# Patient Record
Sex: FEMALE | Race: WHITE | NOT HISPANIC OR LATINO | Employment: FULL TIME
[De-identification: names, ages, dates, MRNs, and addresses within clinical notes are randomized per-mention and may not be internally consistent; named-entity substitution may affect disease eponyms.]

---

## 2017-10-01 ENCOUNTER — HEALTH MAINTENANCE LETTER (OUTPATIENT)
Age: 42
End: 2017-10-01

## 2022-03-31 ENCOUNTER — THERAPY VISIT (OUTPATIENT)
Dept: PHYSICAL THERAPY | Facility: CLINIC | Age: 47
End: 2022-03-31
Payer: COMMERCIAL

## 2022-03-31 DIAGNOSIS — M79.671 RIGHT FOOT PAIN: ICD-10-CM

## 2022-03-31 PROCEDURE — 97110 THERAPEUTIC EXERCISES: CPT | Mod: GP | Performed by: PHYSICAL THERAPIST

## 2022-03-31 PROCEDURE — 97161 PT EVAL LOW COMPLEX 20 MIN: CPT | Mod: GP | Performed by: PHYSICAL THERAPIST

## 2022-03-31 NOTE — LETTER
[facility]  [address]  [place] [address]  [phone]    2022    Re: Alise Rouse   :   1975  MRN:  [id number]   REFERRING PHYSICIAN:   Carlos ABEL [facility]    Date of Initial Evaluation:  3/31/22  Visits:  Rxs Used: 1  Reason for Referral:  Right foot pain    Physical Therapy Initial Evaluation    Subjective:    Patient Health History  Alise Rouse being seen for Tenex treatment on heal for planter fascitis.   Problem began: 3/24/2022.   Problem occurred: plantar fasciitis from running   Pain is reported as 0/10 on pain scale.  General health as reported by patient is excellent.  Pertinent medical history includes: none.   Red flags:  None as reported by patient.  Medical allergies: none.   Surgeries include:  Orthopedic surgery.    Current medications:  None.    Current occupation is Optometrist.     Pt has a few years of running history completing up to 10mile races, started having plantar fasciitis pain without resolution and is now s/p R tenex to plantar fascia on 3/24/22. She is TTWB with rollabout and CAM walking boot x2weeks (22) and will have a followup with MD at that time for further restrictions/limitations.                  Objective:    ANKLE:     PROM L PROM R AROM L AROM R MMT L MMT R   Dorsiflexion   15 12 5/5 5/5   Plantarflexion   68 66 5/5 5/5   Inversion   40 35 5/5 4/5   Eversion   28 25 5/5 4/5   G Toe Ext         G Toe Flex           Edema:  No gross edema    Palpation: mild TTP R medial heel incision    Gait: not tested, she is TTWB with CAM boot using rollabout in clinic    Special Tests: deferred due to known procedure    Assessment/Plan:    Patient is a 46 year old female with R foot complaints.    Patient has the following significant findings with corresponding treatment plan.                Diagnosis 1:  S/p R plantar fascia Tenex  Pain -  hot/cold therapy,  manual therapy, education and home program  Decreased ROM/flexibility - manual therapy, therapeutic exercise, therapeutic activity and home program  Decreased strength - therapeutic exercise, therapeutic activities and home program  Impaired gait - gait training, assistive devices and home program    Cumulative Therapy Evaluation is: Low complexity.  Previous and current functional limitations:  (See Goal Flow Sheet for this information)    Short term and Long term goals: (See Goal Flow Sheet for this information)   Communication ability:  Patient appears to be able to clearly communicate and understand verbal and written communication and follow directions correctly.  Treatment Explanation - The following has been discussed with the patient:   RX ordered/plan of care  Anticipated outcomes  Possible risks and side effects  This patient would benefit from PT intervention to resume normal activities.   Rehab potential is good.    Frequency:  1 X week, once daily  Duration:  for 12 weeks  Discharge Plan:  Achieve all LTG.  Independent in home treatment program.  Reach maximal therapeutic benefit.    Thank you for your referral.    INQUIRIES  Therapist: Giorgi Leblanc DPT  [facility] [place] [facility]  [address]  [place] [address]  Phone: [phone]  Fax: [phone]

## 2022-03-31 NOTE — PROGRESS NOTES
Physical Therapy Initial Evaluation  Subjective:    Patient Health History  Alise Rouse being seen for Tenex treatment on heal for planter fascitis.     Problem began: 3/24/2022.   Problem occurred: plantar fasciitis from running   Pain is reported as 0/10 on pain scale.  General health as reported by patient is excellent.  Pertinent medical history includes: none.   Red flags:  None as reported by patient.  Medical allergies: none.   Surgeries include:  Orthopedic surgery.    Current medications:  None.    Current occupation is Optometrist.                     Pt has a few years of running history completing up to 10mile races, started having plantar fasciitis pain without resolution and is now s/p R tenex to plantar fascia on 3/24/22. She is TTWB with rollabout and CAM walking boot x2weeks (4/7/22) and will have a followup with MD at that time for further restrictions/limitations.                    Objective:      ANKLE:     PROM L PROM R AROM L AROM R MMT L MMT R   Dorsiflexion   15 12 5/5 5/5   Plantarflexion   68 66 5/5 5/5   Inversion   40 35 5/5 4/5   Eversion   28 25 5/5 4/5   G Toe Ext         G Toe Flex           Edema:  No gross edema    Palpation: mild TTP R medial heel incision    Gait: not tested, she is TTWB with CAM boot using rollabout in clinic    Special Tests: deferred due to known procedure              System    Physical Exam    General     ROS    Assessment/Plan:    Patient is a 46 year old female with R foot complaints.    Patient has the following significant findings with corresponding treatment plan.                Diagnosis 1:  S/p R plantar fascia Tenex  Pain -  hot/cold therapy, manual therapy, education and home program  Decreased ROM/flexibility - manual therapy, therapeutic exercise, therapeutic activity and home program  Decreased strength - therapeutic exercise, therapeutic activities and home program  Impaired gait - gait training, assistive devices and home  program    Cumulative Therapy Evaluation is: Low complexity.    Previous and current functional limitations:  (See Goal Flow Sheet for this information)    Short term and Long term goals: (See Goal Flow Sheet for this information)     Communication ability:  Patient appears to be able to clearly communicate and understand verbal and written communication and follow directions correctly.  Treatment Explanation - The following has been discussed with the patient:   RX ordered/plan of care  Anticipated outcomes  Possible risks and side effects  This patient would benefit from PT intervention to resume normal activities.   Rehab potential is good.    Frequency:  1 X week, once daily  Duration:  for 12 weeks  Discharge Plan:  Achieve all LTG.  Independent in home treatment program.  Reach maximal therapeutic benefit.    Please refer to the daily flowsheet for treatment today, total treatment time and time spent performing 1:1 timed codes.

## 2022-04-13 ENCOUNTER — THERAPY VISIT (OUTPATIENT)
Dept: PHYSICAL THERAPY | Facility: CLINIC | Age: 47
End: 2022-04-13
Payer: COMMERCIAL

## 2022-04-13 DIAGNOSIS — M79.671 RIGHT FOOT PAIN: Primary | ICD-10-CM

## 2022-04-13 PROCEDURE — 97110 THERAPEUTIC EXERCISES: CPT | Mod: GP | Performed by: PHYSICAL THERAPIST

## 2022-04-21 ENCOUNTER — THERAPY VISIT (OUTPATIENT)
Dept: PHYSICAL THERAPY | Facility: CLINIC | Age: 47
End: 2022-04-21
Payer: COMMERCIAL

## 2022-04-21 DIAGNOSIS — M79.671 RIGHT FOOT PAIN: Primary | ICD-10-CM

## 2022-04-21 PROCEDURE — 97110 THERAPEUTIC EXERCISES: CPT | Mod: GP | Performed by: PHYSICAL THERAPIST

## 2022-04-28 ENCOUNTER — THERAPY VISIT (OUTPATIENT)
Dept: PHYSICAL THERAPY | Facility: CLINIC | Age: 47
End: 2022-04-28
Payer: COMMERCIAL

## 2022-04-28 DIAGNOSIS — M79.671 RIGHT FOOT PAIN: Primary | ICD-10-CM

## 2022-04-28 PROCEDURE — 97112 NEUROMUSCULAR REEDUCATION: CPT | Mod: GP | Performed by: PHYSICAL THERAPIST

## 2022-04-28 PROCEDURE — 97110 THERAPEUTIC EXERCISES: CPT | Mod: GP | Performed by: PHYSICAL THERAPIST

## 2022-05-10 ENCOUNTER — THERAPY VISIT (OUTPATIENT)
Dept: PHYSICAL THERAPY | Facility: CLINIC | Age: 47
End: 2022-05-10
Payer: COMMERCIAL

## 2022-05-10 DIAGNOSIS — M79.671 RIGHT FOOT PAIN: Primary | ICD-10-CM

## 2022-05-10 PROCEDURE — 97110 THERAPEUTIC EXERCISES: CPT | Mod: GP | Performed by: PHYSICAL THERAPIST

## 2022-05-10 PROCEDURE — 97530 THERAPEUTIC ACTIVITIES: CPT | Mod: GP | Performed by: PHYSICAL THERAPIST

## 2022-05-31 ENCOUNTER — THERAPY VISIT (OUTPATIENT)
Dept: PHYSICAL THERAPY | Facility: CLINIC | Age: 47
End: 2022-05-31
Payer: COMMERCIAL

## 2022-05-31 DIAGNOSIS — M79.671 RIGHT FOOT PAIN: Primary | ICD-10-CM

## 2022-05-31 PROCEDURE — 97110 THERAPEUTIC EXERCISES: CPT | Mod: GP | Performed by: PHYSICAL THERAPIST

## 2022-05-31 NOTE — LETTER
[facility]  [address] [facility] [id number] [phone]    May 31, 2022    Re: Alise Rouse   :   1975  MRN:  [id number]   REFERRING PHYSICIAN:   Carlos Pendleton    [facility]    Date of Initial Evaluation:  3/31/22  Visits:  Rxs Used: 6  Reason for Referral:  Right foot pain    DISCHARGE REPORT  Progress reporting period is from 3/31/22 to 22.     SUBJECTIVE  Subjective: pt reports she has started her golf league and feels the pain in both feet from walking 9holes, she just got new shoes that have more cushion and golfed once which was slightly better. has been running/walking 3/2 regularly.   Current Pain level: 1/10   Initial Pain level: 0/10   The objective findings are from DOS 22.    OBJECTIVE  Objective: able to run/walk TM 4/1mins respectively      ASSESSMENT/PLAN  Updated problem list and treatment plan: Diagnosis 1:  S/p R Tenex to plantar flascia  STG/LTGs have been met or progress has been made towards goals:  Yes, progressing running/walking  Assessment of Progress: The patient's condition is improving.  Self Management Plans:  Patient has been instructed in a home treatment program.  Patient is independent in a home treatment program.  Alise continues to require the following intervention to meet STG and LTG's: PT intervention is no longer required to meet STG/LTG.  We will discharge this patient from PT.    Recommendations:  This patient is ready to be discharged from therapy and continue their home treatment program.    Thank you for your referral.    INQUIRIES  Therapist: Giorgi Leblanc DPT  [facility]  [address] [facility] [address]  Phone: [phone]  Fax: [phone]

## 2022-05-31 NOTE — PROGRESS NOTES
Subjective:  HPI  Physical Exam                    Objective:  System    Physical Exam    General     ROS    Assessment/Plan:    DISCHARGE REPORT    Progress reporting period is from 3/31/22 to 5/31/22.     SUBJECTIVE  Subjective: pt reports she has started her golf league and feels the pain in both feet from walking 9holes, she just got new shoes that have more cushion and golfed once which was slightly better. has been running/walking 3/2 regularly.   Current Pain level: 1/10   Initial Pain level: 0/10        ;   ,     The objective findings are from DOS 5/31/22.    OBJECTIVE  Objective: able to run/walk TM 4/1mins respectively      ASSESSMENT/PLAN  Updated problem list and treatment plan: Diagnosis 1:  S/p R Tenex to plantar flascia  STG/LTGs have been met or progress has been made towards goals:  Yes, progressing running/walking  Assessment of Progress: The patient's condition is improving.  Self Management Plans:  Patient has been instructed in a home treatment program.  Patient is independent in a home treatment program.  Alise continues to require the following intervention to meet STG and LTG's: PT intervention is no longer required to meet STG/LTG.  We will discharge this patient from PT.    Recommendations:  This patient is ready to be discharged from therapy and continue their home treatment program.    Please refer to the daily flowsheet for treatment today, total treatment time and time spent performing 1:1 timed codes.

## 2022-09-18 ENCOUNTER — OFFICE VISIT (OUTPATIENT)
Dept: URGENT CARE | Facility: URGENT CARE | Age: 47
End: 2022-09-18
Payer: COMMERCIAL

## 2022-09-18 VITALS
BODY MASS INDEX: 27.47 KG/M2 | TEMPERATURE: 97.4 F | DIASTOLIC BLOOD PRESSURE: 88 MMHG | HEIGHT: 67 IN | SYSTOLIC BLOOD PRESSURE: 143 MMHG | RESPIRATION RATE: 16 BRPM | WEIGHT: 175 LBS | HEART RATE: 68 BPM | OXYGEN SATURATION: 98 %

## 2022-09-18 DIAGNOSIS — R30.0 DYSURIA: ICD-10-CM

## 2022-09-18 DIAGNOSIS — R82.90 ABNORMAL URINE FINDING: ICD-10-CM

## 2022-09-18 DIAGNOSIS — N30.01 ACUTE CYSTITIS WITH HEMATURIA: Primary | ICD-10-CM

## 2022-09-18 LAB
ALBUMIN UR-MCNC: 100 MG/DL
APPEARANCE UR: ABNORMAL
BACTERIA #/AREA URNS HPF: ABNORMAL /HPF
BILIRUB UR QL STRIP: ABNORMAL
COLOR UR AUTO: ABNORMAL
GLUCOSE UR STRIP-MCNC: NEGATIVE MG/DL
HGB UR QL STRIP: ABNORMAL
KETONES UR STRIP-MCNC: NEGATIVE MG/DL
LEUKOCYTE ESTERASE UR QL STRIP: ABNORMAL
NITRATE UR QL: POSITIVE
OVAL FAT BODIES #/AREA URNS HPF: ABNORMAL /HPF
PH UR STRIP: 6 [PH] (ref 5–7)
RBC #/AREA URNS AUTO: >100 /HPF
SP GR UR STRIP: 1.02 (ref 1–1.03)
SQUAMOUS #/AREA URNS AUTO: ABNORMAL /LPF
UROBILINOGEN UR STRIP-ACNC: 0.2 E.U./DL
WBC #/AREA URNS AUTO: ABNORMAL /HPF
WBC CLUMPS #/AREA URNS HPF: PRESENT /HPF

## 2022-09-18 PROCEDURE — 87186 SC STD MICRODIL/AGAR DIL: CPT | Performed by: PHYSICIAN ASSISTANT

## 2022-09-18 PROCEDURE — 87086 URINE CULTURE/COLONY COUNT: CPT | Performed by: PHYSICIAN ASSISTANT

## 2022-09-18 PROCEDURE — 99203 OFFICE O/P NEW LOW 30 MIN: CPT | Performed by: PHYSICIAN ASSISTANT

## 2022-09-18 PROCEDURE — 81001 URINALYSIS AUTO W/SCOPE: CPT | Performed by: PHYSICIAN ASSISTANT

## 2022-09-18 RX ORDER — SULFAMETHOXAZOLE/TRIMETHOPRIM 800-160 MG
1 TABLET ORAL 2 TIMES DAILY
Qty: 14 TABLET | Refills: 0 | Status: SHIPPED | OUTPATIENT
Start: 2022-09-18 | End: 2022-09-25

## 2022-09-18 NOTE — PATIENT INSTRUCTIONS
Take full course of antibiotics- Bactrim twice daily for 7 days  Urine culture pending, we will call you only if culture shows resistance and change of antibiotic is required or if no growth to stop antibiotics and to follow-up with your primary care provider.  May use over the counter AZO pain relief or Uristat (phenazopyridine) for urinary burning but do not use for 24 hours before future urine tests.  Drink plenty of fluids. Limit caffeine and alcohol as these are bladder irritants.  May take tylenol or ibuprofen as needed for discomfort.   If you develop any vomiting, high fevers or lower back pain, these can be signs of a kidney infection and you should be seen in urgent care or in the ER.  Prevention of future infections by drinking cranberry juice, urination after intercourse and wiping from front to back after using the toilet.  Please follow up with primary care provider if symptoms return, if you're not improving, worsening or new symptoms or for any adverse reactions to medications.

## 2022-09-18 NOTE — PROGRESS NOTES
"Assessment & Plan     Dysuria  - UA Macro with Reflex to Micro and Culture - lab collect; Future  - UA Macro with Reflex to Micro and Culture - lab collect  - Urine Microscopic Exam  - Urine Culture    Acute cystitis with hematuria  - sulfamethoxazole-trimethoprim (BACTRIM DS) 800-160 MG tablet; Take 1 tablet by mouth 2 times daily for 7 days    Abnormal urine finding  Few oval fat bodies seen on microscopy. Follow up with PCP in 1-2 weeks for repeat urine testing.    Start Bactrim twice daily for 7 days today.  Adjust plan as needed based on urine culture result.    15 minutes spent on the date of the encounter doing chart review, patient visit, and documentation     Return in about 3 days (around 9/21/2022) for visit with primary care provider if not improving.     Noemi Garcia PA-C  [facility]    Subjective   Alise Rouse is a 47 year old who presents for the following health issues     Patient presents with:  Urinary Problem  Urgent Care      HPI    Alise presents to clinic today for evaluation of a presumed urinary tract infection.  Symptoms first began on Labor Day, 13 days ago.  She had a virtual visit and was started on Macrobid.  She finished this 1 week ago.  She notes that she had very minimal benefit when she took it and now symptoms feel significantly worse.  She currently has dysuria, urinary urgency and urinary frequency.  No fever, nausea, vomiting, body aches, flank pain.  She notes that her urine is very dark in appearance.    Review of Systems   ROS negative except as stated above.      Objective    BP (!) 143/88   Pulse 68   Temp 97.4  F (36.3  C) (Tympanic)   Resp 16   Ht 1.689 m (5' 6.5\")   Wt 79.4 kg (175 lb)   SpO2 98%   BMI 27.82 kg/m    Physical Exam   GENERAL: healthy, alert and no distress  RESP: lungs clear to auscultation - no rales, rhonchi or wheezes  CV: regular rate and rhythm, normal S1 S2, no S3 or S4, no murmur, click or rub  ABDOMEN: soft, " nontender  MS: no gross musculoskeletal defects noted, no edema  BACK: no CVA tenderness, no paralumbar tenderness    Results for orders placed or performed in visit on 09/18/22   UA Macro with Reflex to Micro and Culture - lab collect     Status: Abnormal    Specimen: Urine, Clean Catch   Result Value Ref Range    Color Urine Dark Yellow (A) Colorless, Straw, Light Yellow, Yellow    Appearance Urine Cloudy (A) Clear    Glucose Urine Negative Negative mg/dL    Bilirubin Urine Small (A) Negative    Ketones Urine Negative Negative mg/dL    Specific Gravity Urine 1.020 1.003 - 1.035    Blood Urine Large (A) Negative    pH Urine 6.0 5.0 - 7.0    Protein Albumin Urine 100  (A) Negative mg/dL    Urobilinogen Urine 0.2 0.2, 1.0 E.U./dL    Nitrite Urine Positive (A) Negative    Leukocyte Esterase Urine Moderate (A) Negative   Urine Microscopic Exam     Status: Abnormal   Result Value Ref Range    Bacteria Urine Moderate (A) None Seen /HPF    RBC Urine >100 (A) 0-2 /HPF /HPF    WBC Urine  (A) 0-5 /HPF /HPF    Squamous Epithelials Urine Few (A) None Seen /LPF    WBC Clumps Urine Present (A) None Seen /HPF    Oval Fat Bodies Urine Few (A) None Seen /HPF

## 2022-09-19 LAB — BACTERIA UR CULT: ABNORMAL

## 2023-09-21 ENCOUNTER — OFFICE VISIT (OUTPATIENT)
Dept: URGENT CARE | Facility: URGENT CARE | Age: 48
End: 2023-09-21
Payer: COMMERCIAL

## 2023-09-21 VITALS
OXYGEN SATURATION: 100 % | DIASTOLIC BLOOD PRESSURE: 88 MMHG | HEART RATE: 91 BPM | TEMPERATURE: 98.3 F | WEIGHT: 173.6 LBS | BODY MASS INDEX: 27.6 KG/M2 | SYSTOLIC BLOOD PRESSURE: 149 MMHG

## 2023-09-21 DIAGNOSIS — R30.0 DYSURIA: Primary | ICD-10-CM

## 2023-09-21 LAB
ALBUMIN UR-MCNC: NEGATIVE MG/DL
APPEARANCE UR: CLEAR
BACTERIA #/AREA URNS HPF: ABNORMAL /HPF
BILIRUB UR QL STRIP: NEGATIVE
CLUE CELLS: ABNORMAL
COLOR UR AUTO: YELLOW
GLUCOSE UR STRIP-MCNC: NEGATIVE MG/DL
HGB UR QL STRIP: ABNORMAL
KETONES UR STRIP-MCNC: NEGATIVE MG/DL
LEUKOCYTE ESTERASE UR QL STRIP: ABNORMAL
NITRATE UR QL: NEGATIVE
PH UR STRIP: 6 [PH] (ref 5–7)
RBC #/AREA URNS AUTO: ABNORMAL /HPF
SP GR UR STRIP: <=1.005 (ref 1–1.03)
SQUAMOUS #/AREA URNS AUTO: ABNORMAL /LPF
TRICHOMONAS, WET PREP: ABNORMAL
UROBILINOGEN UR STRIP-ACNC: 0.2 E.U./DL
WBC #/AREA URNS AUTO: ABNORMAL /HPF
WBC'S/HIGH POWER FIELD, WET PREP: ABNORMAL
YEAST, WET PREP: ABNORMAL

## 2023-09-21 PROCEDURE — 81001 URINALYSIS AUTO W/SCOPE: CPT | Performed by: NURSE PRACTITIONER

## 2023-09-21 PROCEDURE — 99213 OFFICE O/P EST LOW 20 MIN: CPT | Performed by: NURSE PRACTITIONER

## 2023-09-21 PROCEDURE — 87210 SMEAR WET MOUNT SALINE/INK: CPT | Performed by: NURSE PRACTITIONER

## 2023-09-21 RX ORDER — SULFAMETHOXAZOLE/TRIMETHOPRIM 800-160 MG
1 TABLET ORAL 2 TIMES DAILY
COMMUNITY
Start: 2023-09-15

## 2023-09-21 NOTE — PATIENT INSTRUCTIONS
May try using Pyridium/Azo 2-3 days for pain with urination.     Continue to push fluid   Complete oral antibiotic

## 2023-09-21 NOTE — PROGRESS NOTES
Assessment & Plan     1. Dysuria  No indication for infection at this time through urine or wet prep.  Recommend patient continue course of Bactrim.  May try using Pyridium for dysuria.  Recommend follow-up as she has planned with urology and her gynecologist.  We discussed red flag symptoms that would warrant emergent or urgent evaluation.  Patient verbalized understanding was agreement with this plan.  - UA Macroscopic with reflex to Microscopic and Culture; Future  - Wet preparation; Future  - UA Macroscopic with reflex to Microscopic and Culture  - Wet preparation  - UA Microscopic with Reflex to Culture    NERIS [facility]    Harvey Carrero is a 48 year old female who presents to clinic today for the following health issues:  Chief Complaint   Patient presents with    UTI     Urgency, burning and cramping. Sx 1 week. Pt seen at [facility] clinic on 15th, prescribed bactrim - helped with some sx but others still persisting. \  Pt has appt with urology but 6 weeks out.      HPI    Presents to clinic states that she was seen through [facility] clinic and she is being treated currently with Bactrim she has 3 days left of this antibiotic.  She states she is still having some dysuria however other symptoms have cleared such as blood in her urine.  She denies fever or flank pain.  She states she has a history of UTIs.  She has a follow-up appointment scheduled with a urologist.  She also has an appointment with her gynecologist.    Review of Systems  Constitutional, HEENT, cardiovascular, pulmonary, gi and gu systems are negative, except as otherwise noted.      Objective    BP (!) 149/88 (BP Location: Right arm, Cuff Size: Adult Regular)   Pulse 91   Temp 98.3  F (36.8  C) (Tympanic)   Wt 78.7 kg (173 lb 9.6 oz)   SpO2 100%   BMI 27.60 kg/m    Physical Exam   GENERAL: healthy, alert and no distress  NECK: no adenopathy, no asymmetry, masses, or scars and thyroid  normal to palpation  RESP: lungs clear to auscultation - no rales, rhonchi or wheezes  CV: regular rate and rhythm, normal S1 S2, no S3 or S4, no murmur, click or rub, no peripheral edema and peripheral pulses strong  ABDOMEN: soft, nontender, no hepatosplenomegaly, no masses and bowel sounds normal  MS: no gross musculoskeletal defects noted, no edema  BACK: no CVA tenderness, no paralumbar tenderness    Results for orders placed or performed in visit on 09/21/23   UA Macroscopic with reflex to Microscopic and Culture     Status: Abnormal    Specimen: Urine, Midstream   Result Value Ref Range    Color Urine Yellow Colorless, Straw, Light Yellow, Yellow    Appearance Urine Clear Clear    Glucose Urine Negative Negative mg/dL    Bilirubin Urine Negative Negative    Ketones Urine Negative Negative mg/dL    Specific Gravity Urine <=1.005 1.003 - 1.035    Blood Urine Moderate (A) Negative    pH Urine 6.0 5.0 - 7.0    Protein Albumin Urine Negative Negative mg/dL    Urobilinogen Urine 0.2 0.2, 1.0 E.U./dL    Nitrite Urine Negative Negative    Leukocyte Esterase Urine Small (A) Negative   UA Microscopic with Reflex to Culture     Status: Abnormal   Result Value Ref Range    Bacteria Urine Few (A) None Seen /HPF    RBC Urine 2-5 (A) 0-2 /HPF /HPF    WBC Urine 5-10 (A) 0-5 /HPF /HPF    Squamous Epithelials Urine Few (A) None Seen /LPF    Narrative    Urine Culture not indicated   Wet preparation     Status: Abnormal    Specimen: Vagina; Swab   Result Value Ref Range    Trichomonas Absent Absent    Yeast Absent Absent    Clue Cells Absent Absent    WBCs/high power field 3+ (A) None